# Patient Record
Sex: MALE | Race: WHITE | NOT HISPANIC OR LATINO | URBAN - METROPOLITAN AREA
[De-identification: names, ages, dates, MRNs, and addresses within clinical notes are randomized per-mention and may not be internally consistent; named-entity substitution may affect disease eponyms.]

---

## 2017-02-03 ENCOUNTER — OFFICE VISIT - RIVER FALLS (OUTPATIENT)
Dept: FAMILY MEDICINE | Facility: CLINIC | Age: 37
End: 2017-02-03

## 2017-06-29 ENCOUNTER — OFFICE VISIT - RIVER FALLS (OUTPATIENT)
Dept: FAMILY MEDICINE | Facility: CLINIC | Age: 37
End: 2017-06-29

## 2017-06-29 ASSESSMENT — MIFFLIN-ST. JEOR: SCORE: 1821.14

## 2017-07-28 ENCOUNTER — OFFICE VISIT - RIVER FALLS (OUTPATIENT)
Dept: FAMILY MEDICINE | Facility: CLINIC | Age: 37
End: 2017-07-28

## 2017-07-28 ASSESSMENT — MIFFLIN-ST. JEOR: SCORE: 1831.12

## 2017-08-15 ENCOUNTER — OFFICE VISIT - RIVER FALLS (OUTPATIENT)
Dept: FAMILY MEDICINE | Facility: CLINIC | Age: 37
End: 2017-08-15

## 2017-08-15 ASSESSMENT — MIFFLIN-ST. JEOR: SCORE: 1825.68

## 2017-09-15 ENCOUNTER — OFFICE VISIT - RIVER FALLS (OUTPATIENT)
Dept: FAMILY MEDICINE | Facility: CLINIC | Age: 37
End: 2017-09-15

## 2018-07-19 ENCOUNTER — OFFICE VISIT - RIVER FALLS (OUTPATIENT)
Dept: FAMILY MEDICINE | Facility: CLINIC | Age: 38
End: 2018-07-19

## 2018-11-02 ENCOUNTER — OFFICE VISIT - RIVER FALLS (OUTPATIENT)
Dept: FAMILY MEDICINE | Facility: CLINIC | Age: 38
End: 2018-11-02

## 2019-01-08 ENCOUNTER — OFFICE VISIT - RIVER FALLS (OUTPATIENT)
Dept: FAMILY MEDICINE | Facility: CLINIC | Age: 39
End: 2019-01-08

## 2019-01-08 ASSESSMENT — MIFFLIN-ST. JEOR: SCORE: 1901.89

## 2019-09-16 ENCOUNTER — OFFICE VISIT - RIVER FALLS (OUTPATIENT)
Dept: FAMILY MEDICINE | Facility: CLINIC | Age: 39
End: 2019-09-16

## 2019-09-24 ENCOUNTER — OFFICE VISIT - RIVER FALLS (OUTPATIENT)
Dept: FAMILY MEDICINE | Facility: CLINIC | Age: 39
End: 2019-09-24

## 2019-09-24 ASSESSMENT — MIFFLIN-ST. JEOR: SCORE: 1839.29

## 2019-09-27 LAB
B BURGDOR DNA SPEC QL NAA+PROBE: NEGATIVE
IGG P18 AB.: REACTIVE
IGG P23 AB.: ABNORMAL
IGG P28 AB.: REACTIVE
IGG P30 AB.: ABNORMAL
IGG P39 AB.: ABNORMAL
IGG P41 AB.: REACTIVE
IGG P45 AB.: ABNORMAL
IGG P58 AB.: ABNORMAL
IGG P66 AB.: ABNORMAL
IGG P93 AB.: ABNORMAL
IGM P23 AB.: ABNORMAL
IGM P39 AB.: ABNORMAL
IGM P41 AB.: REACTIVE
LYME AB SCREEN - QUEST: 2.02
LYME IGM WESTERN BLOT - HISTORICAL: NEGATIVE

## 2020-06-15 ENCOUNTER — OFFICE VISIT - RIVER FALLS (OUTPATIENT)
Dept: FAMILY MEDICINE | Facility: CLINIC | Age: 40
End: 2020-06-15

## 2021-05-19 ENCOUNTER — OFFICE VISIT - RIVER FALLS (OUTPATIENT)
Dept: FAMILY MEDICINE | Facility: CLINIC | Age: 41
End: 2021-05-19

## 2021-11-03 ENCOUNTER — OFFICE VISIT - RIVER FALLS (OUTPATIENT)
Dept: FAMILY MEDICINE | Facility: CLINIC | Age: 41
End: 2021-11-03

## 2022-01-31 ENCOUNTER — OFFICE VISIT - RIVER FALLS (OUTPATIENT)
Dept: FAMILY MEDICINE | Facility: CLINIC | Age: 42
End: 2022-01-31

## 2022-02-02 ENCOUNTER — COMMUNICATION - RIVER FALLS (OUTPATIENT)
Dept: FAMILY MEDICINE | Facility: CLINIC | Age: 42
End: 2022-02-02

## 2022-02-02 LAB — BACTERIA SPEC CULT: NORMAL

## 2022-02-04 ENCOUNTER — COMMUNICATION - RIVER FALLS (OUTPATIENT)
Dept: FAMILY MEDICINE | Facility: CLINIC | Age: 42
End: 2022-02-04

## 2022-02-04 LAB
TESTOSTERONE FREE: 51.9 PG/ML (ref 35–155)
TESTOSTERONE TOTAL: 304 NG/DL (ref 250–1100)

## 2022-02-11 VITALS
BODY MASS INDEX: 29.18 KG/M2 | DIASTOLIC BLOOD PRESSURE: 76 MMHG | DIASTOLIC BLOOD PRESSURE: 66 MMHG | WEIGHT: 202.8 LBS | WEIGHT: 205 LBS | OXYGEN SATURATION: 98 % | TEMPERATURE: 98.6 F | SYSTOLIC BLOOD PRESSURE: 114 MMHG | OXYGEN SATURATION: 98 % | TEMPERATURE: 97.8 F | HEART RATE: 78 BPM | BODY MASS INDEX: 29.35 KG/M2 | HEART RATE: 80 BPM | SYSTOLIC BLOOD PRESSURE: 112 MMHG | DIASTOLIC BLOOD PRESSURE: 76 MMHG | HEIGHT: 70 IN | WEIGHT: 203.8 LBS | HEIGHT: 70 IN | BODY MASS INDEX: 29.03 KG/M2 | SYSTOLIC BLOOD PRESSURE: 110 MMHG | HEIGHT: 70 IN | HEART RATE: 87 BPM

## 2022-02-11 VITALS
SYSTOLIC BLOOD PRESSURE: 116 MMHG | OXYGEN SATURATION: 97 % | HEART RATE: 89 BPM | DIASTOLIC BLOOD PRESSURE: 80 MMHG | BODY MASS INDEX: 30.68 KG/M2 | TEMPERATURE: 98.8 F | WEIGHT: 213.8 LBS

## 2022-02-11 VITALS
WEIGHT: 203.8 LBS | HEART RATE: 80 BPM | SYSTOLIC BLOOD PRESSURE: 110 MMHG | TEMPERATURE: 98 F | DIASTOLIC BLOOD PRESSURE: 60 MMHG | BODY MASS INDEX: 29.24 KG/M2

## 2022-02-11 VITALS
HEART RATE: 84 BPM | DIASTOLIC BLOOD PRESSURE: 70 MMHG | TEMPERATURE: 98.5 F | WEIGHT: 220.6 LBS | SYSTOLIC BLOOD PRESSURE: 118 MMHG | HEIGHT: 70 IN | BODY MASS INDEX: 31.58 KG/M2

## 2022-02-11 VITALS
WEIGHT: 206.8 LBS | SYSTOLIC BLOOD PRESSURE: 104 MMHG | OXYGEN SATURATION: 96 % | BODY MASS INDEX: 29.67 KG/M2 | HEART RATE: 94 BPM | DIASTOLIC BLOOD PRESSURE: 64 MMHG | OXYGEN SATURATION: 94 % | BODY MASS INDEX: 29.6 KG/M2 | SYSTOLIC BLOOD PRESSURE: 104 MMHG | WEIGHT: 206.8 LBS | HEIGHT: 70 IN | HEART RATE: 92 BPM | DIASTOLIC BLOOD PRESSURE: 70 MMHG

## 2022-02-11 VITALS
WEIGHT: 212.9 LBS | DIASTOLIC BLOOD PRESSURE: 82 MMHG | BODY MASS INDEX: 30.55 KG/M2 | SYSTOLIC BLOOD PRESSURE: 132 MMHG | HEART RATE: 97 BPM

## 2022-02-11 VITALS
SYSTOLIC BLOOD PRESSURE: 118 MMHG | TEMPERATURE: 98.8 F | HEART RATE: 78 BPM | DIASTOLIC BLOOD PRESSURE: 72 MMHG | WEIGHT: 209 LBS

## 2022-02-11 VITALS
TEMPERATURE: 98.2 F | DIASTOLIC BLOOD PRESSURE: 78 MMHG | HEART RATE: 82 BPM | WEIGHT: 206 LBS | BODY MASS INDEX: 29.56 KG/M2 | SYSTOLIC BLOOD PRESSURE: 112 MMHG

## 2022-02-16 NOTE — NURSING NOTE
Phone Message    PCP:   CHT      Time of Call: 9:16 am       Person Calling:  pt  Phone number:  [phone]    Returned call at: 9:40 am    Note:   Pt calls c/o sore throat. Daughter had positive strep with in the last week. Protocol antibotic sent in. Pt notified.     Pharmacy: [facility]

## 2022-02-16 NOTE — TELEPHONE ENCOUNTER
---------------------  From: Jerry Negro PA-C   To: Phone [employer] ([id number]_[facility]);     Sent: 9/30/2019 4:06:04 PM CDT  Subject: General Message     Please call and tell him that his Lyme screen was positive but confirmatory was negative. Finish antibiotic and call if not continuing to improve.    SIDDHARTHA---------------------  From: Olivia Nash MA (Phone Messages Pool ([id number]_[facility]))   To: Jerry Negro PA-C;     Sent: 9/30/2019 4:12:20 PM CDT  Subject: RE: General Message     Return Call     Time: 4:10pm     Note: Called and let patient know, states he had been feeling worse but is now feeling better.---------------------  From: Jerry Negro PA-C   To: Phone Messages Pool ([id number]_[facility]);     Sent: 9/30/2019 4:24:26 PM CDT  Subject: RE: General Message     Noted    KAH

## 2022-02-16 NOTE — PROGRESS NOTES
Patient:   GIANNA PEACOCK            MRN: [id number]            FIN: [id number]               Age:   38 years     Sex:  Male     :  1980   Associated Diagnoses:   Atypical pneumonia   Author:   Rodriguez Perkins MD      Chief Complaint   2019 3:04 PM CST     Chest congestion, cough, phlegm x 1 week      History of Present Illness   patient with chest congestion, cough, phlegm as noted in chief complaint  similar to symptoms in October  no high fevers, has been low grade  notes chest pain, increased shortness of breath         Health Status   Allergies:    Allergic Reactions (All)  Moderate  Zithromax (Gi upset)  Canceled/Inactive Reactions (All)  No known allergies   Medications:  (Selected)   Prescriptions  Prescribed  Flonase 50 mcg/inh nasal spray: = 2 spray(s), nasal, daily, # 1 EA, 2 Refill(s), Type: Maintenance, Pharmacy: [facility] #[id number], 2 spray(s) Nasal daily  azelastine 137 mcg/inh (0.1%) nasal spray: 2 spray(s), Nasal, bid, # 1 EA, 5 Refill(s), Type: Maintenance, Pharmacy: [facility] #[id number], 2 spray(s) Nasal bid   Problem list:    All Problems  Tobacco user / ICD-9-.1 / Confirmed  Obesity / SNOMED CT 5826936482 / Probable      Histories   Past Medical History:    Active  Tobacco user (ICD-9-.1)   Family History:    No family history items have been selected or recorded.   Procedure history:    Appendectomy (369027532) in  at 15 Years.   Social History:        Alcohol Assessment: Current      Tobacco Assessment: Current            Current, Snuff      Home and Environment Assessment            Marital status: .      Physical Examination   Vital Signs   2019 3:04 PM CST Temperature Tympanic 98.5 DegF    Peripheral Pulse Rate 84 bpm    Pulse Site Radial artery    HR Method Manual    Systolic Blood Pressure 118 mmHg    Diastolic Blood Pressure 70 mmHg    Mean Arterial Pressure 86 mmHg    BP Site Left arm    BP Method Manual      Measurements from flowsheet :  Measurements   1/8/2019 3:04 PM CST Height Measured - Standard 70 in    Weight Measured - Standard 220.6 lb    BSA 2.22 m2    Body Mass Index 31.65 kg/m2  HI      General:  Alert and oriented, No acute distress.    Eye:  Pupils are equal, round and reactive to light, Normal conjunctiva.    HENT:  Normocephalic, Tympanic membranes are clear, Oral mucosa is moist, No pharyngeal erythema, No sinus tenderness.    Neck:  Supple, Non-tender, No lymphadenopathy.    Respiratory:       Breath sounds: Bilateral, Rhonchi present, diffusely.    Cardiovascular:  Normal rate, Regular rhythm.       Review / Management   Radiology results   CXR is normal by my read with no bony, cardiac, or pulmonary abnormalities.  Radiology to overread.      Impression and Plan   Diagnosis     Atypical pneumonia (EOO06-MP J18.9).     Course:  recurrent.    Orders     Orders (Selected)   Prescriptions  Prescribed  doxycycline hyclate 100 mg oral tablet: = 1 tab(s) ( 100 mg ), PO, bid, # 20 tab(s), 0 Refill(s), Type: Maintenance, Pharmacy: [facility] #[id number], 1 tab(s) Oral bid,x10 day(s).     Diagnosis     will try claritin for chronic nasal congestion.

## 2022-02-16 NOTE — PROGRESS NOTES
Patient:   GIANNA PEACOCK            MRN: [id number]            FIN: [id number]               Age:   39 years     Sex:  Male     :  1980   Associated Diagnoses:   Skin rash; Back pain   Author:   Jerry Negro PA-C      Report Summary   Diagnosis  Skin rash (FPY55-TU R21).  Patient InstructionsOrders   Visit Information      Date of Service: 2019 03:18 pm  Performing Location: [facility]  Encounter#: [id number]      Primary Care Provider (PCP):  Prashant Centeno MD    NPI# [id number]      Referring Provider:  Jerry Negro PA-C    NPI# [id number]   Visit type:  General concerns.    Accompanied by:  No one.    Source of history:  Self, Medical record.    History limitation:  None.       Chief Complaint   2019 3:25 PM CDT    states sore/rash on left side has doubled in size        History of Present Illness             The patient presents with rash.  The location of the rash is on the left, chest.  The rash is described as swollen and red.  The severity of the symptom(s) associated to the rash is moderate.  The timing/ course of symptom(s) related to the rash is constant and worsening.  The rash has lasted for 1 week(s).  ?bite. Area is enlarging. Painful. No fever or chills. Back pain. CC above noted and confirmed with the patient. Has been on Septa. No fever. .        Review of Systems   Constitutional:  Negative except as documented in history of present illness.    Eye:  Negative.    Ear/Nose/Mouth/Throat:  Negative except as documented in history of present illness.    Respiratory:  Negative.    Cardiovascular:  Negative.    Musculoskeletal:  Negative except as documented in history of present illness.    Integumentary:  Negative except as documented in history of present illness.    Neurologic:  Negative except as documented in history of present illness.       Health Status   Allergies:    Allergic Reactions (All)  Moderate  Zithromax (Gi upset)  Canceled/Inactive Reactions  (All)  No known allergies   Problem list:    All Problems  Tobacco user / ICD-9-.1 / Confirmed  Obesity / SNOMED CT 9666338300 / Probable   Medications:  (Selected)   Prescriptions  Prescribed  Flonase 50 mcg/inh nasal spray: = 2 spray(s), nasal, daily, # 1 EA, 2 Refill(s), Type: Maintenance, Pharmacy: [facility] #[id number], 2 spray(s) Nasal daily  Nasonex 50 mcg/inh nasal spray: 2 spray(s), Nasal, daily, PRN: for allergy symptoms, # 1 EA, 5 Refill(s), Type: Maintenance, Pharmacy: [facility] #[id number], 2 spray(s) Nasal daily,PRN:for allergy symptoms  amoxicillin-clavulanate 875 mg-125 mg oral tablet: = 1 tab(s), PO, q12hr, x 10 day(s), # 20 tab(s), 0 Refill(s), Type: Acute, Pharmacy: [facility] #[id number], 1 tab(s) Oral q12 hrs,x10 day(s)  azelastine 137 mcg/inh (0.1%) nasal spray: 2 spray(s), Nasal, bid, # 1 EA, 5 Refill(s), Type: Maintenance, Pharmacy: [facility] #[id number], 2 spray(s) Nasal bid  sulfamethoxazole-trimethoprim 800 mg-160 mg oral tablet: 1 tab(s), Oral, bid, x 10 day(s), # 20 tab(s), 0 Refill(s), Type: Acute, Pharmacy: [facility] #[id number], 1 tab(s) Oral bid,x10 day(s),    Medications          *denotes recorded medication          amoxicillin-clavulanate 875 mg-125 mg oral tablet: 1 tab(s), PO, q12hr, for 10 day(s), 20 tab(s), 0 Refill(s).          azelastine 137 mcg/inh (0.1%) nasal spray: 2 spray(s), Nasal, bid, 1 EA, 5 Refill(s).          Flonase 50 mcg/inh nasal spray: 2 spray(s), nasal, daily, 1 EA, 2 Refill(s).          Nasonex 50 mcg/inh nasal spray: 2 spray(s), Nasal, daily, PRN: for allergy symptoms, 1 EA, 5 Refill(s).          sulfamethoxazole-trimethoprim 800 mg-160 mg oral tablet: 1 tab(s), Oral, bid, for 10 day(s), 20 tab(s), 0 Refill(s).          Histories   Past Medical History:    Active  Tobacco user (305.1)   Family History:    No family history items have been selected or recorded.   Procedure history:    Appendectomy ([id number]) in 1995 at 15 Years.    Social History:        Alcohol Assessment: Current      Tobacco Assessment: Current            Current, Snuff      Home and Environment Assessment            Marital status: .        Physical Examination   Vital Signs   9/24/2019 3:25 PM CDT Peripheral Pulse Rate 92 bpm    HR Method Electronic    Systolic Blood Pressure 104 mmHg    Diastolic Blood Pressure 70 mmHg    Mean Arterial Pressure 81 mmHg    BP Site Left arm    BP Method Manual    Oxygen Saturation 94 %      Measurements from flowsheet : Measurements   9/24/2019 3:25 PM CDT Height Measured - Standard 70 in    Weight Measured - Standard 206.8 lb    BSA 2.15 m2    Body Mass Index 29.67 kg/m2  HI      General:  Alert and oriented, No acute distress.    Integumentary:  Warm, Intact, Erythematous rash of left chest wall. 4xs larger than last visit. No central clearing..    Psychiatric:  Cooperative, Appropriate mood & affect.       Impression and Plan   Diagnosis     Skin rash (OPX42-AD R21).     Back pain (VRB73-OH M54.9).     Patient Instructions:       Counseled: Patient, Regarding diagnosis, Regarding medications, Activity, Verbalized understanding.    Orders     Orders (Selected)   Prescriptions  Prescribed  amoxicillin-clavulanate 875 mg-125 mg oral tablet: = 1 tab(s), PO, q12hr, x 10 day(s), # 20 tab(s), 0 Refill(s), Type: Acute, Pharmacy: [facility] #[id number], 1 tab(s) Oral q12 hrs,x10 day(s).     FU after labs back.

## 2022-02-16 NOTE — PROGRESS NOTES
Patient:   GIANNA PEACOCK            MRN: [id number]            FIN: [id number]               Age:   39 years     Sex:  Male     :  1980   Associated Diagnoses:   Cellulitis; Allergic rhinitis   Author:   Jerry Negro PA-C      Report Summary   Diagnosis  Cellulitis (ZDJ75-BS L03.90).  Patient InstructionsOrders   Visit Information      Date of Service: 2019 02:51 pm  Performing Location: [facility]  Encounter#: [id number]      Primary Care Provider (PCP):  Prashant Centeno MD    NPI# [id number]      Referring Provider:  Jerry Negro PA-C    NPI# [id number]   Visit type:  General concerns.    Accompanied by:  No one.    Source of history:  Self, Medical record.    History limitation:  None.       Chief Complaint   2019 2:55 PM CDT    c/o sore on left side of body        History of Present Illness             The patient presents with rash.  The location of the rash is on the left, chest.  The rash is described as swollen and red.  The severity of the symptom(s) associated to the rash is moderate.  The timing/ course of symptom(s) related to the rash is constant and worsening.  The rash has lasted for 1 week(s).  ?bite. Area is enlarging. Painful. No fever or chills. Check chronic nasal congestion and scratchy throat. Has been to ENT and allergist. Positive for cats and dust mites. CC above noted and confirmed with the patient. PRN Claritin with some improvement. .        Review of Systems   Constitutional:  Negative except as documented in history of present illness.    Eye:  Negative.    Ear/Nose/Mouth/Throat:  Negative except as documented in history of present illness.    Respiratory:  Negative.    Cardiovascular:  Negative.    Integumentary:  Negative except as documented in history of present illness.    Neurologic:  Negative except as documented in history of present illness.       Health Status   Allergies:    Allergic Reactions (All)  Moderate  Zithromax (Gi  upset)  Canceled/Inactive Reactions (All)  No known allergies   Problem list:    All Problems  Tobacco user / ICD-9-.1 / Confirmed  Obesity / SNOMED CT 2482570344 / Probable   Medications:  (Selected)   Prescriptions  Prescribed  Flonase 50 mcg/inh nasal spray: = 2 spray(s), nasal, daily, # 1 EA, 2 Refill(s), Type: Maintenance, Pharmacy: [facility] #[id number], 2 spray(s) Nasal daily  Nasonex 50 mcg/inh nasal spray: 2 spray(s), Nasal, daily, PRN: for allergy symptoms, # 1 EA, 5 Refill(s), Type: Maintenance, Pharmacy: [facility] #[id number], 2 spray(s) Nasal daily,PRN:for allergy symptoms  azelastine 137 mcg/inh (0.1%) nasal spray: 2 spray(s), Nasal, bid, # 1 EA, 5 Refill(s), Type: Maintenance, Pharmacy: [facility] #[id number], 2 spray(s) Nasal bid  sulfamethoxazole-trimethoprim 800 mg-160 mg oral tablet: 1 tab(s), Oral, bid, x 10 day(s), # 20 tab(s), 0 Refill(s), Type: Acute, Pharmacy: [facility] #[id number], 1 tab(s) Oral bid,x10 day(s),    Medications          *denotes recorded medication          azelastine 137 mcg/inh (0.1%) nasal spray: 2 spray(s), Nasal, bid, 1 EA, 5 Refill(s).          Flonase 50 mcg/inh nasal spray: 2 spray(s), nasal, daily, 1 EA, 2 Refill(s).          Nasonex 50 mcg/inh nasal spray: 2 spray(s), Nasal, daily, PRN: for allergy symptoms, 1 EA, 5 Refill(s).          sulfamethoxazole-trimethoprim 800 mg-160 mg oral tablet: 1 tab(s), Oral, bid, for 10 day(s), 20 tab(s), 0 Refill(s).          Histories   Past Medical History:    Active  Tobacco user (305.1)   Family History:    No family history items have been selected or recorded.   Procedure history:    Appendectomy (090167869) in 1995 at 15 Years.   Social History:        Alcohol Assessment: Current      Tobacco Assessment: Current            Current, Snuff      Home and Environment Assessment            Marital status: .        Physical Examination   Vital Signs   9/16/2019 2:55 PM CDT Peripheral Pulse Rate 94 bpm    HR  Method Electronic    Systolic Blood Pressure 104 mmHg    Diastolic Blood Pressure 64 mmHg    Mean Arterial Pressure 77 mmHg    BP Site Left arm    BP Method Manual    Oxygen Saturation 96 %      Measurements from flowsheet : Measurements   9/16/2019 2:55 PM CDT    Weight Measured - Standard                206.8 lb     General:  Alert and oriented, No acute distress.    Eye:  Pupils are equal, round and reactive to light, Extraocular movements are intact.    HENT:  Normocephalic, Tympanic membranes are clear, Oral mucosa is moist.         Throat: Pharynx ( Erythematous, lymphoid hyperplasia ).    Neck:  Supple, Non-tender, No lymphadenopathy.    Respiratory:  Lungs are clear to auscultation, Respirations are non-labored.    Integumentary:  Warm, Intact, Indurated area on left side. 10 x 7 cm..    Psychiatric:  Cooperative, Appropriate mood & affect.       Impression and Plan   Diagnosis     Cellulitis (HGY31-YC L03.90).     Allergic rhinitis (XJE99-RW J30.9).     Patient Instructions:       Counseled: Patient, Regarding diagnosis, Regarding medications, Activity, Verbalized understanding.    Orders     Orders (Selected)   Prescriptions  Prescribed  Nasonex 50 mcg/inh nasal spray: 2 spray(s), Nasal, daily, PRN: for allergy symptoms, # 1 EA, 5 Refill(s), Type: Maintenance, Pharmacy: [facility] #[id number], 2 spray(s) Nasal daily,PRN:for allergy symptoms  sulfamethoxazole-trimethoprim 800 mg-160 mg oral tablet: 1 tab(s), Oral, bid, x 10 day(s), # 20 tab(s), 0 Refill(s), Type: Acute, Pharmacy: [facility] #[id number], 1 tab(s) Oral bid,x10 day(s).     Warm compresses to side. FU if not gradually improved, or if worse.

## 2022-02-16 NOTE — PROGRESS NOTES
Patient:   GIANNA PEACOCK            MRN: [id number]            FIN: [id number]               Age:   41 years     Sex:  Male     :  1980   Associated Diagnoses:   Prostatitis   Author:   Jerry Negro PA-C      Visit Information      Date of Service: 2021 02:17 pm  Performing Location: [facility]  Encounter#: [id number]      Primary Care Provider (PCP):  Prashant Centeno MD    NPI# [id number]   Visit type:  General concerns.    Accompanied by:  No one.    Source of history:  Self.    Referral source:  Self.    History limitation:  None.       Chief Complaint   11/3/2021 2:22 PM CDT    c/o frequent urination, pressure since  night        History of Present Illness             The patient presents with Urgency, frequency. Hesitancy since the weekend. Mild dysuria. No gross hematuria. No flank pain, No abdominal pain. No fever chills. these are all new. No history of infection or stone. CC above noted and confirmed with the patient..        Review of Systems   Constitutional:  Negative.    Gastrointestinal:  Negative.    Genitourinary:  Negative except as documented in history of present illness.       Health Status   Allergies:    Allergic Reactions (Selected)  Moderate  Zithromax (Gi upset)   Medications:  (Selected)   Prescriptions  Prescribed  sulfamethoxazole-trimethoprim 800 mg-160 mg oral tablet: 1 tab(s), PO, BID, x 21 day(s), # 42 tab(s), 0 Refill(s), Type: Acute, Pharmacy: [facility], 1 tab(s) Oral bid,x21 day(s), 212.9, lb, 21 14:22:00 CDT, Weight Measured   Problem list:    All Problems  Tobacco user / ICD-9-.1 / Confirmed  Obesity / SNOMED CT 7782085672 / Probable      Histories   Past Medical History:    Active  Tobacco user (305.1)   Family History:    No family history items have been selected or recorded.   Procedure history:    Appendectomy (862172342) in  at 15 Years.   Social History:         Electronic Cigarette/Vaping Assessment            Electronic Cigarette Use: Never.      Alcohol Assessment: Current      Tobacco Assessment: Current            Never (less than 100 in lifetime)      Home and Environment Assessment            Marital status: .        Physical Examination   Vital Signs   11/3/2021 3:16 PM CDT Systolic Blood Pressure 132 mmHg  HI    Diastolic Blood Pressure 82 mmHg  HI    Mean Arterial Pressure 99 mmHg    BP Site Right arm    BP Method Manual   11/3/2021 2:22 PM CDT Peripheral Pulse Rate 97 bpm    HR Method Electronic    Systolic Blood Pressure 141 mmHg  HI    Diastolic Blood Pressure 96 mmHg  HI    Mean Arterial Pressure 111 mmHg    BP Site Right arm    BP Method Electronic      Measurements from flowsheet : Measurements   11/3/2021 2:22 PM CDT    Weight Measured - Standard                212.9 lb     Respiratory:  Lungs are clear to auscultation, Respirations are non-labored.    Cardiovascular:  Normal rate, Regular rhythm.    Gastrointestinal:  Soft, Non-tender, Non-distended, No organomegaly.    Genitourinary:  No costovertebral angle tenderness.         Prostate: Bilateral, Palpable, Smooth, Mildly soft. Minimal enlargement.       Review / Management   Results review:  UA with hematuria. .       Impression and Plan   Diagnosis     Prostatitis (XQK97-PR N41.9).     Patient Instructions:       Counseled: Patient, Regarding diagnosis, Regarding treatment, Regarding medications, Verbalized understanding.    Summary:  Have BPs checked at [facility]. Push fluids. If he doesn't feel improved by Monday, to call and will consider CT stone run. If fever, chills, return or to ED. If feeling relief, will want to check UA in three weeks to ensure it returns to normal..    Orders     Orders (Selected)   Prescriptions  Prescribed  sulfamethoxazole-trimethoprim 800 mg-160 mg oral tablet: 1 tab(s), PO, BID, x 21 day(s), # 42 tab(s), 0 Refill(s), Type: Acute, Pharmacy: [facility]  [facility], 1 tab(s) Oral bid,x21 day(s), 212.9, lb, 11/03/21 14:22:00 CDT, Weight Measured.

## 2022-02-16 NOTE — NURSING NOTE
Comprehensive Intake Entered On:  9/16/2019 2:58 PM CDT    Performed On:  9/16/2019 2:55 PM CDT by Lyndsey Lau CMA   Chief Complaint :   c/o sore on left side of body   Weight Measured :   206.8 lb(Converted to: 206 lb 13 oz, 93.80 kg)    Systolic Blood Pressure :   104 mmHg   Diastolic Blood Pressure :   64 mmHg   Mean Arterial Pressure :   77 mmHg   Peripheral Pulse Rate :   94 bpm   BP Site :   Left arm   BP Method :   Manual   HR Method :   Electronic   Oxygen Saturation :   96 %   Lyndsey Lau CMA - 9/16/2019 2:55 PM CDT   Health Status   Allergies Verified? :   Yes   Medication History Verified? :   Yes   Immunizations Current :   Yes   Medical History Verified? :   Yes   Tobacco Use? :   Never smoker   Lyndsey Lau CMA - 9/16/2019 2:55 PM CDT   Consents   Consent for Immunization Exchange :   Consent Granted   Consent for Immunizations to Providers :   Consent Granted   Lyndsey Lau CMA - 9/16/2019 2:55 PM CDT   Meds / Allergies   (As Of: 9/16/2019 2:58:35 PM CDT)   Allergies (Active)   Zithromax  Estimated Onset Date:   Unspecified ; Reactions:   GI upset ; Created By:   Jerry Negro PA-C; Reaction Status:   Active ; Category:   Drug ; Substance:   Zithromax ; Type:   Allergy ; Severity:   Moderate ; Updated By:   Jerry Negro PA-C; Source:   Patient ; Reviewed Date:   9/16/2019 2:57 PM CDT        Medication List   (As Of: 9/16/2019 2:58:35 PM CDT)   Prescription/Discharge Order    azelastine nasal  :   azelastine nasal ; Status:   Prescribed ; Ordered As Mnemonic:   azelastine 137 mcg/inh (0.1%) nasal spray ; Simple Display Line:   2 spray(s), Nasal, bid, 1 EA, 5 Refill(s) ; Ordering Provider:   Prashant Centeno MD; Catalog Code:   azelastine nasal ; Order Dt/Tm:   11/2/2018 3:33:21 PM          fluticasone nasal  :   fluticasone nasal ; Status:   Prescribed ; Ordered As Mnemonic:   Flonase 50 mcg/inh nasal spray ; Simple Display Line:   2 spray(s), nasal, daily, 1 EA, 2  Refill(s) ; Ordering Provider:   Prashant Centeno MD; Catalog Code:   fluticasone nasal ; Order Dt/Tm:   11/2/2018 3:32:46 PM

## 2022-02-16 NOTE — PROGRESS NOTES
Chief Complaint    Pt c/o pain in left foot after stepping on nail. Due for Tdap.  History of Present Illness      Chief complaint as above reviewed and confirmed with patient.  Pt presents to the clinic with concerns re: puncture wound to the L foot, stepped on a dirty/annette barn nail through shoe 2 days ago, has pain, mild swelling.  No fevers.  no drainage.  Not improving discomfort.  In need of tetanus update.  Nail was easily removed in it's entirety.  Review of Systems      Review of systems is negative with the exception of those noted in HPI          Physical Exam   Vitals & Measurements    T: 98.2   F (Tympanic)  HR: 82(Peripheral)  BP: 112/78     WT: 206 lb       nad appears well      exam of the L foot reveals minimal swelling plantar surface of the foot at the medial arch just proximal to the metatarsal heads.  sensation and peripheral pulses intact. puncture site visable but healed.  No discharge. no erythema. mild TTP here.  Assessment/Plan       1. Puncture wound of foot (S91.339A)         levoquin abx prophylaxis given puncture to the foot through shoewear.  Discussed  black box warnings of fluoroquinolones.  FU for persistent or worsening sx.                Orders:         levoFLOXacin, = 1 tab(s) ( 750 mg ), Oral, q 24 hrs, x 7 day(s), # 7 tab(s), 0 Refill(s), Type: Acute, Pharmacy: [facility] , 1 tab(s) Oral q 24 hrs,x7 day(s), 70, in, 09/24/19 15:25:00 CDT, Height Measured, 206, lb, 06/15/20 13:02:00 CDT, W..., (Ordered)         tetanus/diphth/pertuss (Tdap) adult/adol, 0.5 mL, im, once, (Completed)         85991 imadm prq id subq/im njxs 1 vaccine (Charge), Quantity: 1, Encounter for immunization         35828 tdap vaccine 7/> yr im (Charge), Quantity: 1, Encounter for immunization  Patient Information     Name:GIANNA PEACOCK      Address:      [address] [place] [id number]     Sex:Male     YOB: 1980     [phone]     Emergency  Contact:ELLA PEACOCK     MRN:[id number]     FIN:[id number]     Location:[facility]     Date of Service:06/15/2020      Primary Care Physician:       Prashant Centeno MD, [phone]      Attending Physician:       Juanita Johns PA-C, [phone]  Problem List/Past Medical History    Ongoing     Obesity     Tobacco user    Historical     No qualifying data  Procedure/Surgical History     Appendectomy (1995)  Medications    Levaquin 750 mg oral tablet, 750 mg= 1 tab(s), Oral, q 24 hrs  Allergies    Zithromax (GI upset)  Social History    Smoking Status - 06/15/2020     Never smoker     Alcohol - Current, 08/05/2011     Home/Environment      Marital status: ., 08/05/2011     Tobacco - Current, 08/05/2011      Current, Snuff, 01/27/2011  Immunizations      Vaccine Date Status          tetanus/diphth/pertuss (Tdap) adult/adol 06/15/2020 Given          hepatitis B adult vaccine 09/14/2016 Recorded          hepatitis B adult vaccine 03/28/2016 Recorded          hepatitis B adult vaccine 02/22/2016 Recorded          tetanus/diphth/pertuss (Tdap) adult/adol 11/16/2010 Given          influenza 11/16/2010 Given          influenza, H1N1, live 12/07/2009 Recorded          Td 04/03/1996 Recorded          MMR (measles/mumps/rubella) 05/01/1990 Recorded

## 2022-02-16 NOTE — NURSING NOTE
Quick Intake Entered On:  11/3/2021 3:17 PM CDT    Performed On:  11/3/2021 3:16 PM CDT by Jerry Negro PA-C   Systolic Blood Pressure :   132 mmHg (HI)    Diastolic Blood Pressure :   82 mmHg (HI)    Mean Arterial Pressure :   99 mmHg   BP Site :   Right arm   BP Method :   Manual   Jerry Negro PA-C - 11/3/2021 3:16 PM CDT

## 2022-02-16 NOTE — NURSING NOTE
Comprehensive Intake Entered On:  6/15/2020 1:05 PM CDT    Performed On:  6/15/2020 1:02 PM CDT by Darya Rust               Summary   Chief Complaint :   Pt c/o pain in left foot after stepping on nail. Due for Tdap.    Weight Measured :   206 lb(Converted to: 206 lb 0 oz, 93.44 kg)    Systolic Blood Pressure :   112 mmHg   Diastolic Blood Pressure :   78 mmHg   Mean Arterial Pressure :   89 mmHg   Peripheral Pulse Rate :   82 bpm   Temperature Tympanic :   98.2 DegF(Converted to: 36.8 DegC)    Darya Rust - 6/15/2020 1:02 PM CDT   Health Status   Allergies Verified? :   Yes   Medication History Verified? :   Yes   Immunizations Current :   Yes   Medical History Verified? :   Yes   Pre-Visit Planning Status :   Completed   Tobacco Use? :   Never smoker   Darya Rust - 6/15/2020 1:02 PM CDT   Meds / Allergies   (As Of: 6/15/2020 1:05:22 PM CDT)   Allergies (Active)   Zithromax  Estimated Onset Date:   Unspecified ; Reactions:   GI upset ; Created By:   Jerry Negro PA-C; Reaction Status:   Active ; Category:   Drug ; Substance:   Zithromax ; Type:   Allergy ; Severity:   Moderate ; Updated By:   Jerry Negro PA-C; Source:   Patient ; Reviewed Date:   9/24/2019 3:25 PM CDT        Medication List   (As Of: 6/15/2020 1:05:22 PM CDT)   Prescription/Discharge Order    fluticasone nasal  :   fluticasone nasal ; Status:   Processing ; Ordered As Mnemonic:   Flonase 50 mcg/inh nasal spray ; Ordering Provider:   Prashant Centeno MD; Action Display:   Complete ; Catalog Code:   fluticasone nasal ; Order Dt/Tm:   6/15/2020 1:03:40 PM CDT          azelastine nasal  :   azelastine nasal ; Status:   Processing ; Ordered As Mnemonic:   azelastine 137 mcg/inh (0.1%) nasal spray ; Ordering Provider:   Prashant Centeno MD; Action Display:   Complete ; Catalog Code:   azelastine nasal ; Order Dt/Tm:   6/15/2020 1:03:40 PM CDT          mometasone nasal  :   mometasone nasal ; Status:    Processing ; Ordered As Mnemonic:   Nasonex 50 mcg/inh nasal spray ; Ordering Provider:   Jerry Negro PA-C; Action Display:   Complete ; Catalog Code:   mometasone nasal ; Order Dt/Tm:   6/15/2020 1:03:40 PM CDT            ID Risk Screen   Recent Travel History :   No recent travel   Family Member Travel History :   No recent travel   Other Exposure to Infectious Disease :   Unknown   Darya Rust - 6/15/2020 1:02 PM CDT

## 2022-02-16 NOTE — NURSING NOTE
Comprehensive Intake Entered On:  11/3/2021 2:26 PM CDT    Performed On:  11/3/2021 2:22 PM CDT by Lyndsey Lau CMA               Summary   Chief Complaint :   c/o frequent urination, pressure since Sunday night   Weight Measured :   212.9 lb(Converted to: 212 lb 14 oz, 96.570 kg)    Systolic Blood Pressure :   141 mmHg (HI)    Diastolic Blood Pressure :   96 mmHg (HI)    Mean Arterial Pressure :   111 mmHg   Peripheral Pulse Rate :   97 bpm   BP Site :   Right arm   BP Method :   Electronic   HR Method :   Electronic   Lyndsey Lau CMA - 11/3/2021 2:22 PM CDT   Health Status   Allergies Verified? :   No   Medication History Verified? :   Yes   Immunizations Current :   Yes   Medical History Verified? :   Yes   Tobacco Use? :   Never smoker   Lyndsey Lau CMA - 11/3/2021 2:22 PM CDT   Consents   Consent for Immunization Exchange :   Consent Granted   Consent for Immunizations to Providers :   Consent Granted   Lyndsey Lau CMA - 11/3/2021 2:22 PM CDT   Meds / Allergies   (As Of: 11/3/2021 2:26:14 PM CDT)   Allergies (Active)   Zithromax  Estimated Onset Date:   Unspecified ; Reactions:   GI upset ; Created By:   Jerry Negro PA-C; Reaction Status:   Active ; Category:   Drug ; Substance:   Zithromax ; Type:   Allergy ; Severity:   Moderate ; Updated By:   Jerry Negro PA-C; Source:   Patient ; Reviewed Date:   11/3/2021 2:25 PM CDT        Medication List   (As Of: 11/3/2021 2:26:14 PM CDT)   No Known Home Medications     Lyndsey Lau CMA - 11/3/2021 2:25:22 PM

## 2022-02-16 NOTE — PROGRESS NOTES
Patient:   GIANNA PEACOCK            MRN: [id number]            FIN: [id number]               Age:   36 years     Sex:  Male     :  1980   Associated Diagnoses:   Pneumonia   Author:   Jerry Negro PA-C      Visit Information      Date of Service: 2017 04:09 pm  Performing Location: [facility]  Encounter#: [id number]      Primary Care Provider (PCP):  Prashant Centeno MD    NPI# [id number]   Visit type:  New symptom.    Source of history:  Self, Medical record.    History limitation:  None.       Chief Complaint   2/3/2017 4:13 PM CST     chest congestion, coughing up green mucus, chest starting to hurt x 2 weeks        History of Present Illness             The patient presents with cough.  The cough is described as productive green sputum.  The severity of the cough is moderate.  The cough is constant.  The cough has lasted for 2 week(s).  The context of the cough: occurred in association with illness.  Associated symptoms consist of nasal congestion and denies shortness of breath.  CC above noted and confirmed with the patient..        Review of Systems   Eye:  Negative.    Ear/Nose/Mouth/Throat:  Negative except as documented in history of present illness.    Respiratory:  Negative except as documented in history of present illness.             Health Status   Allergies:    Allergic Reactions (All)  Moderate  Zithromax (Gi upset)  Canceled/Inactive Reactions (All)  No known allergies   Medications:  (Selected)   Prescriptions  Prescribed  Augmentin 875 mg oral tablet: 1 tab(s), PO, q12hr, x 10 day(s), # 20 tab(s), 0 Refill(s), Type: Acute, Pharmacy: [facility] #[id number], 1 tab(s) po q12 hrs,x10 day(s)  Valtrex 1 g oral tablet: 1 tab(s) ( 1 gm ), po, q12 hrs, Instructions: PRN for cold.chancre sores, PRN: Other (see comment), # 20 tab(s), 0 Refill(s), Type: Maintenance, Pharmacy: [facility] #[id number], 1 tab(s) po q12 hrs,x1 day(s),PRN:Other (see comment),Instr:PRN  for cold....   Problem list:    All Problems  Tobacco user / ICD-9-.1 / Confirmed      Histories   Past Medical History:    Active  Tobacco user (305.1)   Family History:    No family history items have been selected or recorded.   Procedure history:    Appendectomy (815069322) in 1995 at 15 Years.   Social History:        Alcohol Assessment: Current      Tobacco Assessment: Current            Current, Snuff      Home and Environment Assessment            Marital status: .        Physical Examination   Vital Signs   2/3/2017 4:13 PM CST Temperature Tympanic 98.0 DegF    Peripheral Pulse Rate 80 bpm    Pulse Site Radial artery    HR Method Manual    Systolic Blood Pressure 110 mmHg    Diastolic Blood Pressure 60 mmHg    Mean Arterial Pressure 77 mmHg    BP Site Right arm    BP Method Electronic      Measurements from flowsheet : Measurements   2/3/2017 4:13 PM CST     Weight Measured - Standard                203.8 lb     General:  Alert and oriented, No acute distress.    Eye:  Pupils are equal, round and reactive to light, Extraocular movements are intact, Normal conjunctiva.    HENT:  Normocephalic, Oral mucosa is moist, No pharyngeal erythema.    Neck:  Supple, Non-tender, No lymphadenopathy.    Respiratory:  Lungs are clear to auscultation, Respirations are non-labored, Breath sounds are equal.    Cardiovascular:  Normal rate, Regular rhythm, No murmur.    Psychiatric:  Cooperative, Appropriate mood & affect.       Impression and Plan   Diagnosis     Pneumonia (WUI77-DD J18.9).     Patient Instructions:       Counseled: Patient, Regarding diagnosis, Regarding treatment, Regarding medications, Regarding activity, Verbalized understanding.    Orders     Orders (Selected)   Prescriptions  Prescribed  Augmentin 875 mg oral tablet: 1 tab(s), PO, q12hr, x 10 day(s), # 20 tab(s), 0 Refill(s), Type: Acute, Pharmacy: [facility] #[id number], 1 tab(s) po q12 hrs,x10 day(s).     Take medicine as prescribed, side  effects discussed.  Tylenol/ibuprofen for fever and discomfort.  Push fluids.  RTC if not improving in 36-48 hours, prior if concerns as we have discussed.

## 2022-02-16 NOTE — PROGRESS NOTES
Patient:   GIANNA PEACOCK            MRN: [id number]            FIN: [id number]               Age:   38 years     Sex:  Male     :  1980   Associated Diagnoses:   Pneumonia   Author:   Prashant Centeno MD      Chief Complaint   2018 3:09 PM CDT    chest congestion, cough, phlegm, sore throat, post nasal drip x 1 week     Chief complaint and symptoms noted above confirmed with patient.      History of Present Illness             The patient presents with cough.  The cough is described as hacking and paroxysmal.  The severity of the cough is moderate.  The cough is constant.  The cough has lasted for 2 week(s).  Exacerbating factors consist of lying flat and recent illness.  Relieving factors consist of none.  Associated symptoms consist of chills, fever, denies chest pain, denies nasal congestion and denies rhinorrhea.        Review of Systems   Constitutional:  Negative except as documented in history of present illness.    Ear/Nose/Mouth/Throat:  Negative.    Respiratory:  Negative except as documented in history of present illness.    Cardiovascular:  Negative.       Health Status   Allergies:    Allergic Reactions (Selected)  Moderate  Zithromax (Gi upset)   Medications:  (Selected)   Prescriptions  Prescribed  Astelin 137 mcg/inh nasal spray: 2 spray(s), nasal, bid, # 1 EA, 0 Refill(s), Type: Maintenance, Pharmacy: [facility] #[id number], 2 spray(s) nasal bid  Flonase 50 mcg/inh nasal spray: 2 spray(s), nasal, daily, # 1 EA, 2 Refill(s), Type: Maintenance, Pharmacy: [facility] #[id number], 2 spray(s) nasal daily   Problem list:    All Problems  Tobacco user / ICD-9-.1 / Confirmed      Histories   Past Medical History:    Active  Tobacco user (ICD-9-.1)   Family History:    No family history items have been selected or recorded.   Procedure history:    Appendectomy (SNOMED CT 733074665) in  at 15 Years.   Social History:        Alcohol Assessment: Current      Tobacco Assessment:  Current            Current, Snuff      Home and Environment Assessment            Marital status: .        Physical Examination   Vital Signs   11/2/2018 3:09 PM CDT Temperature Tympanic 98.8 DegF    Peripheral Pulse Rate 89 bpm    HR Method Electronic    Systolic Blood Pressure 116 mmHg    Diastolic Blood Pressure 80 mmHg    Mean Arterial Pressure 92 mmHg    BP Site Right arm    BP Method Manual    Oxygen Saturation 97 %      Measurements from flowsheet : Measurements   11/2/2018 3:09 PM CDT    Weight Measured - Standard                213.8 lb     General:  Alert and oriented, No acute distress.    Eye:  Normal conjunctiva.    HENT:  Normocephalic, Oral mucosa is moist, No pharyngeal erythema.    Neck:  Supple, Non-tender.    Respiratory:       Breath sounds: Diminished.    Cardiovascular:  Normal rate, Regular rhythm.       Impression and Plan   Diagnosis     Pneumonia (ZNE76-JN J15.9).     Course:  Not improving.    Plan:  Emphasize oral fluids.    Orders     Orders   Pharmacy:  doxycycline hyclate 100 mg oral tablet (Prescribe): = 1 tab(s) ( 100 mg ), PO, bid, x 10 day(s), # 20 tab(s), 0 Refill(s), Type: Maintenance, Pharmacy: [facility] #[id number], 1 tab(s) Oral bid,x10 day(s).     Orders   Pharmacy:  Flonase 50 mcg/inh nasal spray (Prescribe): = 2 spray(s), nasal, daily, # 1 EA, 2 Refill(s), Type: Maintenance, Pharmacy: [facility] #[id number], 2 spray(s) Nasal daily  Flonase 50 mcg/inh nasal spray (Modify): 2 spray(s), nasal, daily, # 1 EA, 2 Refill(s), Type: Hard Stop, Pharmacy: [facility] #[id number].     Orders   Pharmacy:  azelastine 137 mcg/inh (0.1%) nasal spray (Prescribe): 2 spray(s), Nasal, bid, # 1 EA, 5 Refill(s), Type: Maintenance, Pharmacy: [facility] #[id number], 2 spray(s) Nasal bid  Astelin 137 mcg/inh nasal spray (Complete).     Symptomatic Treatment.

## 2022-02-16 NOTE — LETTER
(Inserted Image. Unable to display)   June 12, 2019      GIANNA PEACOCK  [address] [place] [id number]        Dear GIANNA,      Thank you for selecting [facility] ([facility]viously [facility]) for your healthcare needs.     Our records indicate you are due for the following services:     Annual Physical    To schedule an appointment or if you have further questions, please contact your primary clinic:   [facility]          [phone]   [facility]    [phone]             [facility]         [phone]      Powered by [employer]    Sincerely,    Prashant Centeno M.D.

## 2022-02-16 NOTE — PROGRESS NOTES
Patient:   GIANNA PEACOCK            MRN: [id number]            FIN: [id number]               Age:   37 years     Sex:  Male     :  1980   Associated Diagnoses:   Acute recurrent maxillary sinusitis   Author:   Rodriguez Perkins MD      Chief Complaint   2017 3:24 PM CDT    pt here for sinus pain / pressure x 1 week. Tried ibuprofen.        History of Present Illness             The patient presents with sinus problem.  The sinus problem is located in the maxillary sinus.  The sinus problem is characterized by nasal congestion, headache and facial pain.  The severity of the sinus problem is moderate.  The sinus problem is constant and remains unchanged.  The sinus problem has lasted for 1 week(s).  Associated symptoms consist of ear pain and denies fever.        Health Status   Allergies:    Allergic Reactions (All)  Moderate  Zithromax (Gi upset)  Canceled/Inactive Reactions (All)  No known allergies   Medications:  (Selected)   Prescriptions  Prescribed  Valtrex 1 g oral tablet: 1 tab(s) ( 1 gm ), po, q12 hrs, Instructions: PRN for cold.chancre sores, PRN: Other (see comment), # 20 tab(s), 0 Refill(s), Type: Maintenance, Pharmacy: [facility] #[id number], 1 tab(s) po q12 hrs,x1 day(s),PRN:Other (see comment),Instr:PRN for cold....   Problem list:    All Problems  Tobacco user / ICD-9-.1 / Confirmed      Histories   Past Medical History:    Active  Tobacco user (ICD-9-.1)   Family History:    No family history items have been selected or recorded.   Procedure history:    Appendectomy (472433199) in  at 15 Years.   Social History:        Alcohol Assessment: Current      Tobacco Assessment: Current            Current, Snuff      Home and Environment Assessment            Marital status: .        Physical Examination   Vital Signs   2017 3:24 PM CDT Temperature Temporal 97.8 DegF    Peripheral Pulse Rate 78 bpm    Systolic Blood Pressure 112 mmHg    Diastolic Blood Pressure 66 mmHg     Mean Arterial Pressure 81 mmHg    Oxygen Saturation 98 %      Measurements from flowsheet : Measurements   6/29/2017 3:24 PM CDT Height Measured - Standard 70 in    Weight Measured - Standard 202.8 lb    BSA 2.13 m2    Body Mass Index 29.1 kg/m2      General:  Alert and oriented, No acute distress.    Eye:  Pupils are equal, round and reactive to light, Normal conjunctiva.    HENT:  Normocephalic, Tympanic membranes are clear, Oral mucosa is moist, No pharyngeal erythema.         Sinus: Bilateral, Maxillary sinus, Tenderness.    Neck:  Supple, Non-tender.    Respiratory:  Lungs are clear to auscultation.    Cardiovascular:  Normal rate, Regular rhythm.       Impression and Plan   Diagnosis     Acute recurrent maxillary sinusitis (COE96-RK J01.01).     Plan:  Signs and symptoms and over the counter treatment of congestion discussed.  Should resolve over 5-7 days from start of antibiotic.  Return to clinic if severe headache, difficulty swallowing, chest pain, or if symptoms become more severe or any other concerns.  Call with questions..    Orders     Orders   Pharmacy:  amoxicillin 500 mg oral capsule (Prescribe): 2 cap(s) ( 1,000 mg ), PO, BID, x 10 day(s), # 40 cap(s), 0 Refill(s), Type: Maintenance, Pharmacy: [facility] #[id number], 2 cap(s) po bid,x10 day(s).

## 2022-02-16 NOTE — PROGRESS NOTES
Patient:   GIANNA PEACOCK            MRN: [id number]            FIN: [id number]               Age:   40 years     Sex:  Male     :  1980   Associated Diagnoses:   Acute sinusitis   Author:   Richard Aguilar MD      Visit Information      Date of Service: 2021 11:08 am  Performing Location: [facility]  Encounter#: [id number]      Primary Care Provider (PCP):  Prashant Centeno MD    NPI# [id number]      Referring Provider:  Richard Aguilar MD    NPI# [id number]   Visit type:  Video Visit via Doximity.    Participants in room during visit:  Patient   Location of patient:  Home  Location of provider: Clinic  Video Start Time:  1255  Video End Time:   1305    Today's visit was conducted via video conference due to the COVID-19 pandemic.  The patient's consent to proceed with a video visit has been obtained and documented.      Chief Complaint   2021 12:48 PM CDT   video visit consent Pt c/o cold, cough, sore throat , sinus headache, he denies fever x one week        History of Present Illness   Patient is a 40 year old male who is being evaluated via a billable video visit.  Usually gets 1-2 sinus infections a year. Symptoms began 5 days ago. Has had eyes watering, sinus headaches, post nasal drip with sore throat. Feeling tired last couple days.    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      Review of Systems   Constitutional:  Fatigue, No fever.    Ear/Nose/Mouth/Throat:  Nasal congestion, Sore throat.    Respiratory:  Cough, No shortness of breath.    Gastrointestinal:  No nausea, No vomiting, No diarrhea, No abdominal pain.    Musculoskeletal:  No muscle pain.    Neurologic:  No headache.    No loss of taste or smell      Health Status   Allergies:    Allergic Reactions (Selected)  Moderate  Zithromax (Gi upset)   Medications:  (Selected)      Problem list:    All Problems  Obesity / SNOMED CT 1706906583 / Probable  Tobacco user / ICD-9-.1 / Confirmed       Histories   Procedure history:    Appendectomy (SNOMED CT 118066590) in 1995 at 15 Years.      Physical Examination   General:  Alert and oriented, No acute distress.    Respiratory:  Respirations are non-labored.    Psychiatric:  Cooperative, Appropriate mood & affect, Normal judgment.      Social History: Self employed [employer]      Impression and Plan   Diagnosis     Acute sinusitis (QLI44-TU J01.90).     Start amoxicillin with NETI pot. Arrange Coronavirus curbside testing

## 2022-02-16 NOTE — NURSING NOTE
Comprehensive Intake Entered On:  9/24/2019 3:27 PM CDT    Performed On:  9/24/2019 3:25 PM CDT by Lyndsey Lau CMA               Summary   Chief Complaint :   states sore/rash on left side has doubled in size   Weight Measured :   206.8 lb(Converted to: 206 lb 13 oz, 93.80 kg)    Height Measured :   70 in(Converted to: 5 ft 10 in, 177.80 cm)    Body Mass Index :   29.67 kg/m2 (HI)    Body Surface Area :   2.15 m2   Systolic Blood Pressure :   104 mmHg   Diastolic Blood Pressure :   70 mmHg   Mean Arterial Pressure :   81 mmHg   Peripheral Pulse Rate :   92 bpm   BP Site :   Left arm   BP Method :   Manual   HR Method :   Electronic   Oxygen Saturation :   94 %   Lyndsey Lau CMA - 9/24/2019 3:25 PM CDT   Health Status   Allergies Verified? :   Yes   Medication History Verified? :   Yes   Immunizations Current :   Yes   Medical History Verified? :   Yes   Tobacco Use? :   Never smoker   Lyndsey Lau CMA - 9/24/2019 3:25 PM CDT   Consents   Consent for Immunization Exchange :   Consent Granted   Consent for Immunizations to Providers :   Consent Granted   Lyndsey Lau CMA - 9/24/2019 3:25 PM CDT   Meds / Allergies   (As Of: 9/24/2019 3:27:31 PM CDT)   Allergies (Active)   Zithromax  Estimated Onset Date:   Unspecified ; Reactions:   GI upset ; Created By:   Jerry Negro PA-C; Reaction Status:   Active ; Category:   Drug ; Substance:   Zithromax ; Type:   Allergy ; Severity:   Moderate ; Updated By:   Jerry Negro PA-C; Source:   Patient ; Reviewed Date:   9/24/2019 3:25 PM CDT        Medication List   (As Of: 9/24/2019 3:27:31 PM CDT)   Prescription/Discharge Order    azelastine nasal  :   azelastine nasal ; Status:   Prescribed ; Ordered As Mnemonic:   azelastine 137 mcg/inh (0.1%) nasal spray ; Simple Display Line:   2 spray(s), Nasal, bid, 1 EA, 5 Refill(s) ; Ordering Provider:   Prashant Centeno MD; Catalog Code:   azelastine nasal ; Order Dt/Tm:   11/2/2018 3:33:21 PM          fluticasone nasal  :    fluticasone nasal ; Status:   Prescribed ; Ordered As Mnemonic:   Flonase 50 mcg/inh nasal spray ; Simple Display Line:   2 spray(s), nasal, daily, 1 EA, 2 Refill(s) ; Ordering Provider:   Will Centeno MD; Catalog Code:   fluticasone nasal ; Order Dt/Tm:   11/2/2018 3:32:46 PM          mometasone nasal  :   mometasone nasal ; Status:   Prescribed ; Ordered As Mnemonic:   Nasonex 50 mcg/inh nasal spray ; Simple Display Line:   2 spray(s), Nasal, daily, PRN: for allergy symptoms, 1 EA, 5 Refill(s) ; Ordering Provider:   Jerry Negro PA-C; Catalog Code:   mometasone nasal ; Order Dt/Tm:   9/16/2019 3:29:17 PM          sulfamethoxazole-trimethoprim  :   sulfamethoxazole-trimethoprim ; Status:   Prescribed ; Ordered As Mnemonic:   sulfamethoxazole-trimethoprim 800 mg-160 mg oral tablet ; Simple Display Line:   1 tab(s), Oral, bid, for 10 day(s), 20 tab(s), 0 Refill(s) ; Ordering Provider:   Jerry Negro PA-C; Catalog Code:   sulfamethoxazole-trimethoprim ; Order Dt/Tm:   9/16/2019 3:30:05 PM

## 2022-02-16 NOTE — NURSING NOTE
Comprehensive Intake Entered On:  5/19/2021 12:54 PM CDT    Performed On:  5/19/2021 12:48 PM CDT by Christina Boo               Summary   Chief Complaint :   video visit consent Pt c/o cold, cough, sore throat , sinus headache, he denies fever x one week   Christina Boo - 5/19/2021 12:48 PM CDT   Health Status   Immunizations Current :   Yes   Christina Boo - 5/19/2021 12:48 PM CDT   Consents   Consent for Immunization Exchange :   Consent Granted   Consent for Immunizations to Providers :   Consent Granted   Christina Boo - 5/19/2021 12:48 PM CDT   Meds / Allergies   (As Of: 5/19/2021 12:54:07 PM CDT)   Allergies (Active)   Zithromax  Estimated Onset Date:   Unspecified ; Reactions:   GI upset ; Created By:   Jerry Negro PA-C; Reaction Status:   Active ; Category:   Drug ; Substance:   Zithromax ; Type:   Allergy ; Severity:   Moderate ; Updated By:   Jerry Negro PA-C; Source:   Patient ; Reviewed Date:   5/19/2021 12:52 PM CDT        Medication List   (As Of: 5/19/2021 12:54:07 PM CDT)        ID Risk Screen   Recent Travel History :   No recent travel   Family Member Travel History :   No recent travel   Other Exposure to Infectious Disease :   Unknown   COVID-19 Testing Status :   No COVID-19 test performed   Christina Boo - 5/19/2021 12:48 PM CDT   Social History   Social History   (As Of: 5/19/2021 12:54:07 PM CDT)   Alcohol:  Current      (Last Updated: 8/5/2011 7:40:56 AM CDT by Yumi Munoz )         Tobacco:  Current      Never (less than 100 in lifetime)   (Last Updated: 5/19/2021 12:52:44 PM CDT by Christina Boo)          Electronic Cigarette/Vaping:        Electronic Cigarette Use: Never.   (Last Updated: 5/19/2021 12:52:49 PM CDT by Christina Boo)          Home/Environment:        Marital status: .   (Last Updated: 8/5/2011 7:40:43 AM CDT by Yumi Munoz)

## 2022-02-16 NOTE — TELEPHONE ENCOUNTER
---------------------  From: Olivia Nash MA   Sent: 6/15/2020 8:36:39 AM CDT  Subject: General Message     PCP:   CHT      Time of Call:  8:30       Person Calling:  Fernando  Phone number:    Note:   Patient called stating he stepped on a rust nail, advised he be seen in clinic. Is due for tetnus shot.    Pharmacy: n/a    Last office visit and reason:  9/24/19    Transferred to: N/A

## 2022-02-16 NOTE — NURSING NOTE
Comprehensive Intake Entered On:  1/8/2019 3:09 PM CST    Performed On:  1/8/2019 3:04 PM CST by Olivia Nash MA               Summary   Chief Complaint :   Chest congestion, cough, phlegm x 1 week    Weight Measured :   220.6 lb(Converted to: 220 lb 10 oz, 100.06 kg)    Height Measured :   70 in(Converted to: 5 ft 10 in, 177.80 cm)    Body Mass Index :   31.65 kg/m2 (HI)    Body Surface Area :   2.22 m2   Systolic Blood Pressure :   118 mmHg   Diastolic Blood Pressure :   70 mmHg   Mean Arterial Pressure :   86 mmHg   Peripheral Pulse Rate :   84 bpm   BP Site :   Left arm   Pulse Site :   Radial artery   BP Method :   Manual   HR Method :   Manual   Temperature Tympanic :   98.5 DegF(Converted to: 36.9 DegC)    Olivia Nash MA - 1/8/2019 3:04 PM CST   Health Status   Allergies Verified? :   Yes   Medication History Verified? :   Yes   Immunizations Current :   Yes   Medical History Verified? :   Yes   Pre-Visit Planning Status :   Completed   Tobacco Use? :   Never smoker   Olivia Nash MA - 1/8/2019 3:04 PM CST   Consents   Consent for Immunization Exchange :   Consent Granted   Consent for Immunizations to Providers :   Consent Granted   Olivia Nash MA - 1/8/2019 3:04 PM CST   Meds / Allergies   (As Of: 1/8/2019 3:09:12 PM CST)   Allergies (Active)   Zithromax  Estimated Onset Date:   Unspecified ; Reactions:   GI upset ; Created By:   Jerry Negro PA-C; Reaction Status:   Active ; Category:   Drug ; Substance:   Zithromax ; Type:   Allergy ; Severity:   Moderate ; Updated By:   Jerry Negro PA-C; Source:   Patient ; Reviewed Date:   1/8/2019 3:07 PM CST        Medication List   (As Of: 1/8/2019 3:09:12 PM CST)   Prescription/Discharge Order    azelastine nasal  :   azelastine nasal ; Status:   Prescribed ; Ordered As Mnemonic:   azelastine 137 mcg/inh (0.1%) nasal spray ; Simple Display Line:   2 spray(s), Nasal, bid, 1 EA, 5 Refill(s) ; Ordering Provider:   Prashant Centeno MD; Catalog  Code:   azelastine nasal ; Order Dt/Tm:   11/2/2018 3:33:21 PM          fluticasone nasal  :   fluticasone nasal ; Status:   Prescribed ; Ordered As Mnemonic:   Flonase 50 mcg/inh nasal spray ; Simple Display Line:   2 spray(s), nasal, daily, 1 EA, 2 Refill(s) ; Ordering Provider:   Prashant Centeno MD; Catalog Code:   fluticasone nasal ; Order Dt/Tm:   11/2/2018 3:32:46 PM          doxycycline  :   doxycycline ; Status:   Processing ; Ordered As Mnemonic:   doxycycline hyclate 100 mg oral tablet ; Ordering Provider:   Prashant Centeno MD; Action Display:   Complete ; Catalog Code:   doxycycline ; Order Dt/Tm:   1/8/2019 3:07:08 PM

## 2022-02-16 NOTE — PROGRESS NOTES
Patient:   GIANNA PEACOCK            MRN: [id number]            FIN: [id number]               Age:   37 years     Sex:  Male     :  1980   Associated Diagnoses:   Recurrent maxillary sinusitis   Author:   Jerry Negro PA-C      Visit Information      Primary Care Provider (PCP):  Prashant Centeno MD# [id number]   Visit type:  General concerns.    Accompanied by:  No one.    Source of history:  Self, Medical record.    Referral source:  Self.    History limitation:  None.       Chief Complaint   2017 3:52 PM CDT    Patient here for ongoing sinus pain / pressure. Patient reports a sore throat, and ear pain.        History of Present Illness             The patient presents with sinus problem.  The sinus problem is located in the maxillary sinus.  The severity of the sinus problem is moderate.  The sinus problem is constant.  The sinus problem has lasted for 4 week(s).  The context of the sinus problem: occurred in association with illness.  Associated symptoms consist of ear pain, sore throat, denies fever and denies cough.  In end of  and diagnosed with sinusitis. Has Amoxil. Called in two weeks later with persistent symptoms. Given Augmentin. Sinus pressure has improved. Now ears feel full and throat is raw. Feels tight. CC above noted and confirmed with the patient. No fevers. Minimal cough. Spits up phlegm. .        Review of Systems   Constitutional:  Negative except as documented in history of present illness.    Eye:  Negative.    Ear/Nose/Mouth/Throat:  Negative except as documented in history of present illness.    Respiratory:  Negative except as documented in history of present illness.       Health Status   Allergies:    Allergic Reactions (All)  Moderate  Zithromax (Gi upset)  Canceled/Inactive Reactions (All)  No known allergies   Medications:  (Selected)   Prescriptions  Prescribed  Levaquin 750 mg oral tablet: 1 tab(s) ( 750 mg ), PO, q24hr, # 10 tab(s), 0 Refill(s), Type:  Maintenance, Pharmacy: [facility] #[id number], 1 tab(s) po q 24 hrs,x10 day(s)  Valtrex 1 g oral tablet: 1 tab(s) ( 1 gm ), po, q12 hrs, Instructions: PRN for cold.chancre sores, PRN: Other (see comment), # 20 tab(s), 0 Refill(s), Type: Maintenance, Pharmacy: [facility] #[id number], 1 tab(s) po q12 hrs,x1 day(s),PRN:Other (see comment),Instr:PRN for cold....  predniSONE 20 mg oral tablet: 1 tab(s) ( 20 mg ), PO, Daily, # 7 tab(s), 0 Refill(s), Type: Maintenance, Pharmacy: [facility] #[id number], 1 tab(s) po daily,x7 day(s)   Problem list:    All Problems  Tobacco user / ICD-9-.1 / Confirmed      Histories   Past Medical History:    Active  Tobacco user (305.1)   Family History:    No family history items have been selected or recorded.   Procedure history:    Appendectomy ([id number]) in 1995 at 15 Years.   Social History:        Alcohol Assessment: Current      Tobacco Assessment: Current            Current, Snuff      Home and Environment Assessment            Marital status: .        Physical Examination   Vital Signs   7/28/2017 3:52 PM CDT Peripheral Pulse Rate 87 bpm    Systolic Blood Pressure 114 mmHg    Diastolic Blood Pressure 76 mmHg    Mean Arterial Pressure 89 mmHg    Oxygen Saturation 98 %      Measurements from flowsheet : Measurements   7/28/2017 3:52 PM CDT Height Measured - Standard 70 in    Weight Measured - Standard 205.0 lb    BSA 2.14 m2    Body Mass Index 29.41 kg/m2      General:  Alert and oriented, No acute distress.    Eye:  Pupils are equal, round and reactive to light, Extraocular movements are intact, Normal conjunctiva.    HENT:  Normocephalic, Tympanic membranes are clear, Oral mucosa is moist.         Ear: Tympanic membrane ( Grey, Fluid in middle ear ).         Throat: Pharynx ( Erythematous, Purulent post nasal drip. ).    Neck:  Supple, Non-tender, No lymphadenopathy.    Respiratory:  Lungs are clear to auscultation, Respirations are non-labored.    Cardiovascular:  Normal  rate, Regular rhythm, No murmur.       Impression and Plan   Diagnosis     Recurrent maxillary sinusitis (YJB51-JY J01.01).     Patient Instructions:       Counseled: Patient, Regarding diagnosis, Regarding medications, Activity, Verbalized understanding.    Orders     Orders (Selected)   Prescriptions  Prescribed  Levaquin 750 mg oral tablet: 1 tab(s) ( 750 mg ), PO, q24hr, # 10 tab(s), 0 Refill(s), Type: Maintenance, Pharmacy: [facility] #[id number], 1 tab(s) po q 24 hrs,x10 day(s)  predniSONE 20 mg oral tablet: 1 tab(s) ( 20 mg ), PO, Daily, # 7 tab(s), 0 Refill(s), Type: Maintenance, Pharmacy: [facility] #[id number], 1 tab(s) po daily,x7 day(s).     Take medicine as prescribed, side effects discussed.  Tylenol/ibuprofen for fever and discomfort.  Push fluids.  RTC if not improving in 36-48 hours, prior if concerns as we have discussed.

## 2022-02-16 NOTE — PROGRESS NOTES
Patient:   GIANNA PEACOCK            MRN: [id number]            FIN: [id number]               Age:   38 years     Sex:  Male     :  1980   Associated Diagnoses:   Atypical mole   Author:   Prashant Centeno MD      Visit Information      Date of Service: 2018 04:00 pm  Performing Location: [facility]  Encounter#: [id number]      Primary Care Provider (PCP):  Prashant Centeno MD    NPI# [id number]      Referring Provider:  Prashant Centeno MD# [id number]      Procedure   Biopsy procedure   Date/ Time:  2018 4:22:00 PM.     Confirmed: patient, procedure, side, site, safety procedures followed.     Performed by: self.     Informed consent: signed by patient.     Indication: diagnostic evaluation, enlargement.     Preparation and technique: skin prepped (in usual sterile fashion, with alcohol), local anesthesia 1% lidocaine without epinephrine, technique shave biopsy, hemostasis achieved using electrocautery.     Site #  1: left, posterior, scalp, size and depth (length  1  cm, width  1  cm, superficial), specimen sent to pathology.     Site #  2: right, posterior, scalp, size and depth (length  1  cm, width  1  cm, superficial), specimen sent to pathology.     Completion: estimated blood loss minimal, dressing applied with antibiotic ointment.     Procedure tolerated: well.     No Complications.        Impression and Plan   Diagnosis     Atypical mole (HHM38-PO L81.4).

## 2022-02-16 NOTE — PROGRESS NOTES
Patient:   GIANNA PEACOCK            MRN: [id number]            FIN: [id number]               Age:   37 years     Sex:  Male     :  1980   Associated Diagnoses:   Rhinitis   Author:   Jerry Negro PA-C      Visit Information      Primary Care Provider (PCP):  Prashant Centeno MD# [id number]   Visit type:  General concerns.    Accompanied by:  No one.    Source of history:  Self, Medical record.    Referral source:  Self.    History limitation:  None.       Chief Complaint   8/15/2017 3:22 PM CDT    Sinus pressure and drainage. Sore throat. HA. Bilateral ear fullness.  Sx  started around the fourth of July.        History of Present Illness             The patient presents with sinus problem.  The sinus problem is located in the maxillary sinus.  The severity of the sinus problem is moderate.  The sinus problem is constant.  The sinus problem has lasted for 6 week(s).  The context of the sinus problem: occurred in association with illness.  Associated symptoms consist of ear pain, sore throat, denies fever and denies cough.  In end of  and diagnosed with sinusitis. Has Amoxil. Called in two weeks later with persistent symptoms. Given Augmentin. Then was in and given Levaquin and Prednisone. Sinus pressure has improved. Now right ear feel full and throat is raw. Feels tight. CC above noted and confirmed with the patient. No fevers. Minimal cough. Spits up clear phlegm.  No reflux symptoms. No history of seasonal allergies. .        Review of Systems   Constitutional:  Negative except as documented in history of present illness.    Eye:  Negative.    Ear/Nose/Mouth/Throat:  Negative except as documented in history of present illness.    Respiratory:  Negative except as documented in history of present illness.       Health Status   Allergies:    Allergic Reactions (All)  Moderate  Zithromax (Gi upset)  Canceled/Inactive Reactions (All)  No known allergies   Medications:  (Selected)    Prescriptions  Prescribed  Astelin 137 mcg/inh nasal spray: 2 spray(s), nasal, bid, # 1 EA, 0 Refill(s), Type: Maintenance, Pharmacy: [facility] #[id number], 2 spray(s) nasal bid  Flonase 50 mcg/inh nasal spray: 2 spray(s), nasal, daily, # 1 EA, 2 Refill(s), Type: Maintenance, Pharmacy: [facility] #[id number], 2 spray(s) nasal daily  Valtrex 1 g oral tablet: 1 tab(s) ( 1 gm ), po, q12 hrs, Instructions: PRN for cold.chancre sores, PRN: Other (see comment), # 20 tab(s), 0 Refill(s), Type: Maintenance, Pharmacy: [facility] #[id number], 1 tab(s) po q12 hrs,x1 day(s),PRN:Other (see comment),Instr:PRN for cold....   Problem list:    All Problems  Tobacco user / ICD-9-.1 / Confirmed      Histories   Past Medical History:    Active  Tobacco user (305.1)   Family History:    No family history items have been selected or recorded.   Procedure history:    Appendectomy ([id number]) in 1995 at 15 Years.   Social History:        Alcohol Assessment: Current      Tobacco Assessment: Current            Current, Snuff      Home and Environment Assessment            Marital status: .        Physical Examination   Vital Signs   8/15/2017 3:22 PM CDT Temperature Tympanic 98.6 DegF    Peripheral Pulse Rate 80 bpm    Systolic Blood Pressure 110 mmHg    Diastolic Blood Pressure 76 mmHg    Mean Arterial Pressure 87 mmHg      Measurements from flowsheet : Measurements   8/15/2017 3:22 PM CDT Height Measured - Standard 70 in    Weight Measured - Standard 203.8 lb    BSA 2.13 m2    Body Mass Index 29.24 kg/m2      General:  Alert and oriented, No acute distress.    Eye:  Pupils are equal, round and reactive to light, Extraocular movements are intact, Normal conjunctiva.    HENT:  Normocephalic, Tympanic membranes are clear, Oral mucosa is moist.         Ear: Right ear, Tympanic membrane ( Grey, Retracted ).         Throat: Tonsils ( Erythematous ), Pharynx ( Erythematous ).    Neck:  Supple, Non-tender, No lymphadenopathy.     Respiratory:  Lungs are clear to auscultation, Respirations are non-labored.    Cardiovascular:  Normal rate, Regular rhythm, No murmur.       Impression and Plan   Diagnosis     Rhinitis (WOC39-UI J31.0).     Patient Instructions:       Counseled: Patient, Regarding diagnosis, Regarding medications, Activity, Verbalized understanding.    Orders     Orders (Selected)   Prescriptions  Prescribed  Astelin 137 mcg/inh nasal spray: 2 spray(s), nasal, bid, # 1 EA, 0 Refill(s), Type: Maintenance, Pharmacy: [facility] #[id number], 2 spray(s) nasal bid  Flonase 50 mcg/inh nasal spray: 2 spray(s), nasal, daily, # 1 EA, 2 Refill(s), Type: Maintenance, Pharmacy: [facility] #[id number], 2 spray(s) nasal daily.     Call Monday. If not better will get into ENT or Dr. Adams.

## 2022-03-02 VITALS
TEMPERATURE: 97.9 F | WEIGHT: 212 LBS | SYSTOLIC BLOOD PRESSURE: 147 MMHG | BODY MASS INDEX: 30.42 KG/M2 | HEART RATE: 98 BPM | DIASTOLIC BLOOD PRESSURE: 94 MMHG

## 2022-03-02 NOTE — PROGRESS NOTES
Patient:   GIANNA PEACOCK            MRN: [id number]            FIN: [id number]               Age:   41 years     Sex:  Male     :  1980   Associated Diagnoses:   Polyuria; ED (erectile dysfunction); Fatigue   Author:   Jerry Negro PA-C      Report Summary   Diagnosis  ED (erectile dysfunction) (APL84-OJ N52.9).  Patient InstructionsOrders   Visit Information      Date of Service: 2022 05:29 pm  Performing Location: [facility]  Encounter#: [id number]      Primary Care Provider (PCP):  Jacobo RIVAS, Prashant MURILLOI# [id number]   Visit type:  General concerns.    Accompanied by:  No one.    Source of history:  Self.    Referral source:  Self.    History limitation:  None.       Chief Complaint   2022 5:31 PM CST    Polyuria and bladder pressure x 1 week. Similar to November visit.        History of Present Illness             The patient presents with Urgency, frequency. Hesitancy since the weekend. Mild dysuria. No gross hematuria. No flank pain, No abdominal pain. No fever chills. Similar to November. He did clear at that time. ED more recently. No history of infection or stone. CC above noted and confirmed with the patient..        Review of Systems   Constitutional:  Negative.    Gastrointestinal:  Negative.    Genitourinary:  Negative except as documented in history of present illness.       Health Status   Allergies:    Allergic Reactions (Selected)  Moderate  Zithromax (Gi upset)   Medications:  (Selected)   Prescriptions  Prescribed  Cipro 500 mg oral tablet: = 1 tab(s) ( 500 mg ), Oral, q12 hrs, x 21 day(s), # 42 tab(s), 0 Refill(s), Type: Acute, Pharmacy: [facility] #[id number], 1 tab(s) Oral q12 hrs,x21 day(s), 212, lb, 22 17:31:00 CST, Weight Measured   Problem list:    All Problems  Tobacco user / ICD-9-.1 / Confirmed  Obesity / SNOMED CT [id number] / Probable      Histories   Past Medical History:    Active  Tobacco user (305.1)   Family History:     No family history items have been selected or recorded.   Procedure history:    Appendectomy ([id number]) in 1995 at 15 Years.   Social History:        Electronic Cigarette/Vaping Assessment            Electronic Cigarette Use: Never.      Alcohol Assessment: Current      Tobacco Assessment: Current            Never (less than 100 in lifetime)      Home and Environment Assessment            Marital status: .        Physical Examination   Vital Signs   1/31/2022 5:31 PM CST Temperature Temporal 97.9 DegF    Peripheral Pulse Rate 98 bpm    Pulse Site Radial artery    HR Method Electronic    Systolic Blood Pressure 147 mmHg  HI    Diastolic Blood Pressure 94 mmHg  HI    Mean Arterial Pressure 112 mmHg    BP Site Right arm    BP Method Electronic      Measurements from flowsheet : Measurements   1/31/2022 5:31 PM CST    Weight Measured - Standard                212 lb     Respiratory:  Lungs are clear to auscultation, Respirations are non-labored.    Cardiovascular:  Normal rate, Regular rhythm.    Gastrointestinal:  Soft, Non-tender, Non-distended, No organomegaly.    Genitourinary:  No costovertebral angle tenderness, No scrotal tenderness, No inguinal tenderness, No urethral discharge, No lesions.       Review / Management   Results review:  UA clear.       Impression and Plan   Diagnosis     Polyuria (HHL87-MA R35.89).     ED (erectile dysfunction) (TVK03-PM N52.9).     Fatigue (OBO96-IY R53.83).     Patient Instructions:       Counseled: Patient, Regarding diagnosis, Regarding treatment, Regarding medications, Verbalized understanding.    Orders     Orders (Selected)   Outpatient Orders  Ordered (In Transit)  Culture, Urine, Routine* ([facility]): Specimen Type: Urine (Clean Catch), Collection Date: 01/31/22 17:35:00 CST  Testosterone, Free (Dialysis) and Total (LC/MS/MS)* ([facility]): Specimen Type: Serum, Collection Date: 01/31/22 18:02:00 CST  Prescriptions  Prescribed  Cipro 500 mg oral tablet: = 1 tab(s) ( 500  mg ), Oral, q12 hrs, x 21 day(s), # 42 tab(s), 0 Refill(s), Type: Acute, Pharmacy: [facility] #[id number], 1 tab(s) Oral q12 hrs,x21 day(s), 212, lb, 01/31/22 17:31:00 CST, Weight Measured.     Plan:  Refer to Urology.

## 2022-03-02 NOTE — TELEPHONE ENCOUNTER
---------------------  From: Mercedes Clarke LPN (Phone Messages Pool ([id number]_[facility]))   Sent: 2/4/2022 4:55:38 PM CST  Subject: results/referral     Phone Message    PCP:   CHT      Time of Call:  4:49pm       Person Calling:  pt    Note:   Pt calling stating he had some labs done and was told he would get a call with results but has not heard anything.    Told pt result letters were mailed out 2/2/22 and 2/4/22. Read letters to pt.   Pt says he was suppose to get set up with a specialist and has  not heard anything. Told him referral was sent to [facility] and number was given for pt to call and schedule.    Last office visit and reason:  1/31/22 Dysuria with [facility]

## 2022-03-02 NOTE — LETTER
(Inserted Image. Unable to display)           [address] [place] [address]  [phone]    February 02, 2022      GIANNA PEACOCK      [address] [place] [address]        Dear GIANNA,    Thank you for selecting [facility] for your healthcare needs. Below you will find the result of your recent test(s) done at our clinic.     Your urine culture was negative.      Result Name Current Result   Urine Culture  See comment 1/31/2022       Please contact my practice at [phone] if you have any questions or concerns.      Sincerely,        Prashant Centeno MD ,   Dipika Perkins MD,   Jerry Negro PA-C                  What do you labs mean?  Below is a glossary of commonly ordered labs:  LDL - Bad Cholesterol HDL - Good Cholesterol  AST/ALT - Liver Function Cr/creatinine - Kidney Function  Microalbumin - Kidney Function  TSH - Thyroid Function PSA- Prostate  Hgb - iron stores/blood count Hgb A1c - Diabetic control

## 2022-03-02 NOTE — LETTER
(Inserted Image. Unable to display)           [address] [place] [address]  [phone]    February 04, 2022      GIANNA PEACOCK      [address] [place] [address]        Dear GIANNA,    Thank you for selecting [facility] for your healthcare needs. Below you will find the result of your recent test(s) done at our clinic.     These are normal. We will see what Urology has to offer.      Result Name Current Result Reference Range   Testosterone Total (ng/dL)  304 1/31/2022 250 - 1,100   Testosterone Free (pg/mL)  51.9 1/31/2022 35.0 - 155.0       Please contact my practice at [phone] if you have any questions or concerns.      Sincerely,        Prashant Centeno MD ,   Dipika Perkins MD,   Jerry Negro PA-C                  What do you labs mean?  Below is a glossary of commonly ordered labs:  LDL - Bad Cholesterol HDL - Good Cholesterol  AST/ALT - Liver Function Cr/creatinine - Kidney Function  Microalbumin - Kidney Function  TSH - Thyroid Function PSA- Prostate  Hgb - iron stores/blood count Hgb A1c - Diabetic control

## 2022-03-02 NOTE — NURSING NOTE
Comprehensive Intake Entered On:  1/31/2022 5:35 PM CST    Performed On:  1/31/2022 5:31 PM CST by Kathy Banuelos CMA               Summary   Chief Complaint :   Polyuria and bladder pressure x 1 week. Similar to November visit.    Weight Measured :   212 lb(Converted to: 212 lb 0 oz, 96.162 kg)    Systolic Blood Pressure :   147 mmHg (HI)    Diastolic Blood Pressure :   94 mmHg (HI)    Mean Arterial Pressure :   112 mmHg   Peripheral Pulse Rate :   98 bpm   BP Site :   Right arm   Pulse Site :   Radial artery   BP Method :   Electronic   HR Method :   Electronic   Temperature Temporal :   97.9 DegF(Converted to: 36.6 DegC)    Kathy Banuelos CMA - 1/31/2022 5:31 PM CST   Health Status   Allergies Verified? :   Yes   Medication History Verified? :   Yes   Immunizations Current :   Yes   Pre-Visit Planning Status :   Not completed   Kathy Banuelos CMA - 1/31/2022 5:31 PM CST   Meds / Allergies   (As Of: 1/31/2022 5:35:29 PM CST)   Allergies (Active)   Zithromax  Estimated Onset Date:   Unspecified ; Reactions:   GI upset ; Created By:   Jerry Negro PA-C; Reaction Status:   Active ; Category:   Drug ; Substance:   Zithromax ; Type:   Allergy ; Severity:   Moderate ; Updated By:   Jerry Negro PA-C; Source:   Patient ; Reviewed Date:   11/3/2021 3:17 PM CDT        Medication List   (As Of: 1/31/2022 5:35:29 PM CST)   No Known Home Medications     Kathy Banuelos CMA - 1/31/2022 5:34:12 PM

## 2022-03-11 ENCOUNTER — CARE COORDINATION (OUTPATIENT)
Dept: FAMILY MEDICINE | Facility: CLINIC | Age: 42
End: 2022-03-11

## 2022-03-15 ENCOUNTER — TRANSFERRED RECORDS (OUTPATIENT)
Dept: HEALTH INFORMATION MANAGEMENT | Facility: CLINIC | Age: 42
End: 2022-03-15

## 2022-04-06 ENCOUNTER — TRANSFERRED RECORDS (OUTPATIENT)
Dept: HEALTH INFORMATION MANAGEMENT | Facility: CLINIC | Age: 42
End: 2022-04-06

## 2022-06-16 NOTE — PROCEDURES
Accession Number:       [id number]  PATHOLOGIST:     Samira Chin M.D., Board Certified in Anatomic Pathology, Clinical Pathology and Cytopathology 4  (electronic signature)

## 2022-06-16 NOTE — PROCEDURES
Accession Number:       [id number]  A SOURCE:     Skin, left occiput  A GROSS DESCRIPTION:     See comment       Specimen is received in formalin, labeled with       multiple patient identifier(s) and consists of       one piece of skin measuring 0.7 x 0.6 x 0.2 cm,       irregular in shape and tan-gray in color. The       margins are inked green. The specimen is       trisected and entirely submitted in one       cassette(s).           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  B SOURCE:     Skin, right occiput  B GROSS DESCRIPTION:     See comment       Specimen is received in formalin, labeled with       multiple patient identifier(s) and consists of       one piece of skin measuring 0.9 x 0.8 x 0.3 cm,       irregular in shape and tan-gray in color. The       margins are inked green. The specimen is serially       sectioned and entirely submitted in one       cassette(s).  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

## 2024-06-11 ENCOUNTER — TRANSFERRED RECORDS (OUTPATIENT)
Dept: HEALTH INFORMATION MANAGEMENT | Facility: CLINIC | Age: 44
End: 2024-06-11